# Patient Record
Sex: MALE | Race: OTHER | Employment: PART TIME | URBAN - METROPOLITAN AREA
[De-identification: names, ages, dates, MRNs, and addresses within clinical notes are randomized per-mention and may not be internally consistent; named-entity substitution may affect disease eponyms.]

---

## 2019-01-18 ENCOUNTER — HOSPITAL ENCOUNTER (OUTPATIENT)
Dept: GENERAL RADIOLOGY | Age: 39
Discharge: HOME | End: 2019-01-20

## 2019-01-18 DIAGNOSIS — M54.41 CHRONIC RIGHT-SIDED LOW BACK PAIN WITH RIGHT-SIDED SCIATICA: ICD-10-CM

## 2019-01-18 DIAGNOSIS — G89.29 CHRONIC RIGHT-SIDED LOW BACK PAIN WITH RIGHT-SIDED SCIATICA: ICD-10-CM

## 2019-01-18 PROCEDURE — 72100 X-RAY EXAM L-S SPINE 2/3 VWS: CPT

## 2019-06-17 ENCOUNTER — HOSPITAL ENCOUNTER (EMERGENCY)
Age: 39
Discharge: HOME | End: 2019-06-17

## 2019-06-17 ENCOUNTER — APPOINTMENT (OUTPATIENT)
Dept: CT IMAGING | Age: 39
End: 2019-06-17

## 2019-06-17 VITALS
WEIGHT: 170 LBS | SYSTOLIC BLOOD PRESSURE: 113 MMHG | TEMPERATURE: 98.3 F | HEART RATE: 79 BPM | HEIGHT: 68 IN | OXYGEN SATURATION: 100 % | RESPIRATION RATE: 16 BRPM | BODY MASS INDEX: 25.76 KG/M2 | DIASTOLIC BLOOD PRESSURE: 69 MMHG

## 2019-06-17 DIAGNOSIS — N20.1 URETEROLITHIASIS: Primary | ICD-10-CM

## 2019-06-17 LAB
ALBUMIN SERPL-MCNC: 4.1 G/DL (ref 3.5–4.6)
ALP BLD-CCNC: 93 U/L (ref 35–104)
ALT SERPL-CCNC: 27 U/L (ref 0–41)
ANION GAP SERPL CALCULATED.3IONS-SCNC: 10 MEQ/L (ref 9–15)
AST SERPL-CCNC: 22 U/L (ref 0–40)
BASOPHILS ABSOLUTE: 0.1 K/UL (ref 0–0.2)
BASOPHILS RELATIVE PERCENT: 0.8 %
BILIRUB SERPL-MCNC: 0.4 MG/DL (ref 0.2–0.7)
BUN BLDV-MCNC: 21 MG/DL (ref 6–20)
CALCIUM SERPL-MCNC: 9.1 MG/DL (ref 8.5–9.9)
CHLORIDE BLD-SCNC: 104 MEQ/L (ref 95–107)
CO2: 27 MEQ/L (ref 20–31)
CREAT SERPL-MCNC: 1.03 MG/DL (ref 0.7–1.2)
EOSINOPHILS ABSOLUTE: 0.6 K/UL (ref 0–0.7)
EOSINOPHILS RELATIVE PERCENT: 5.9 %
GFR AFRICAN AMERICAN: >60
GFR NON-AFRICAN AMERICAN: >60
GLOBULIN: 2.6 G/DL (ref 2.3–3.5)
GLUCOSE BLD-MCNC: 139 MG/DL (ref 70–99)
HCT VFR BLD CALC: 43.3 % (ref 42–52)
HEMOGLOBIN: 15 G/DL (ref 14–18)
LIPASE: 36 U/L (ref 12–95)
LYMPHOCYTES ABSOLUTE: 4.2 K/UL (ref 1–4.8)
LYMPHOCYTES RELATIVE PERCENT: 39 %
MCH RBC QN AUTO: 31.4 PG (ref 27–31.3)
MCHC RBC AUTO-ENTMCNC: 34.6 % (ref 33–37)
MCV RBC AUTO: 90.7 FL (ref 80–100)
MONOCYTES ABSOLUTE: 0.9 K/UL (ref 0.2–0.8)
MONOCYTES RELATIVE PERCENT: 8.7 %
NEUTROPHILS ABSOLUTE: 4.9 K/UL (ref 1.4–6.5)
NEUTROPHILS RELATIVE PERCENT: 45.6 %
PDW BLD-RTO: 13.5 % (ref 11.5–14.5)
PLATELET # BLD: 488 K/UL (ref 130–400)
POTASSIUM SERPL-SCNC: 3.8 MEQ/L (ref 3.4–4.9)
RBC # BLD: 4.78 M/UL (ref 4.7–6.1)
SODIUM BLD-SCNC: 141 MEQ/L (ref 135–144)
TOTAL PROTEIN: 6.7 G/DL (ref 6.3–8)
WBC # BLD: 10.7 K/UL (ref 4.8–10.8)

## 2019-06-17 PROCEDURE — 83690 ASSAY OF LIPASE: CPT

## 2019-06-17 PROCEDURE — 6370000000 HC RX 637 (ALT 250 FOR IP): Performed by: NURSE PRACTITIONER

## 2019-06-17 PROCEDURE — 80053 COMPREHEN METABOLIC PANEL: CPT

## 2019-06-17 PROCEDURE — 85025 COMPLETE CBC W/AUTO DIFF WBC: CPT

## 2019-06-17 PROCEDURE — 36415 COLL VENOUS BLD VENIPUNCTURE: CPT

## 2019-06-17 PROCEDURE — 96376 TX/PRO/DX INJ SAME DRUG ADON: CPT

## 2019-06-17 PROCEDURE — 74176 CT ABD & PELVIS W/O CONTRAST: CPT

## 2019-06-17 PROCEDURE — 6360000002 HC RX W HCPCS: Performed by: NURSE PRACTITIONER

## 2019-06-17 PROCEDURE — 99284 EMERGENCY DEPT VISIT MOD MDM: CPT

## 2019-06-17 PROCEDURE — 96375 TX/PRO/DX INJ NEW DRUG ADDON: CPT

## 2019-06-17 PROCEDURE — 96374 THER/PROPH/DIAG INJ IV PUSH: CPT

## 2019-06-17 RX ORDER — OXYCODONE HYDROCHLORIDE AND ACETAMINOPHEN 5; 325 MG/1; MG/1
1 TABLET ORAL EVERY 6 HOURS PRN
Qty: 10 TABLET | Refills: 0 | Status: SHIPPED | OUTPATIENT
Start: 2019-06-17 | End: 2019-06-22

## 2019-06-17 RX ORDER — ONDANSETRON 2 MG/ML
4 INJECTION INTRAMUSCULAR; INTRAVENOUS ONCE
Status: COMPLETED | OUTPATIENT
Start: 2019-06-17 | End: 2019-06-17

## 2019-06-17 RX ORDER — TAMSULOSIN HYDROCHLORIDE 0.4 MG/1
0.4 CAPSULE ORAL DAILY
Qty: 10 CAPSULE | Refills: 0 | Status: SHIPPED | OUTPATIENT
Start: 2019-06-17

## 2019-06-17 RX ORDER — OXYCODONE HYDROCHLORIDE AND ACETAMINOPHEN 5; 325 MG/1; MG/1
1 TABLET ORAL EVERY 6 HOURS PRN
Qty: 10 TABLET | Refills: 0 | Status: SHIPPED | OUTPATIENT
Start: 2019-06-17 | End: 2019-06-17 | Stop reason: SDUPTHER

## 2019-06-17 RX ORDER — TAMSULOSIN HYDROCHLORIDE 0.4 MG/1
0.8 CAPSULE ORAL DAILY
Status: DISCONTINUED | OUTPATIENT
Start: 2019-06-17 | End: 2019-06-17 | Stop reason: HOSPADM

## 2019-06-17 RX ORDER — MORPHINE SULFATE 2 MG/ML
4 INJECTION, SOLUTION INTRAMUSCULAR; INTRAVENOUS
Status: COMPLETED | OUTPATIENT
Start: 2019-06-17 | End: 2019-06-17

## 2019-06-17 RX ORDER — KETOROLAC TROMETHAMINE 30 MG/ML
30 INJECTION, SOLUTION INTRAMUSCULAR; INTRAVENOUS ONCE
Status: COMPLETED | OUTPATIENT
Start: 2019-06-17 | End: 2019-06-17

## 2019-06-17 RX ADMIN — ONDANSETRON 4 MG: 2 INJECTION INTRAMUSCULAR; INTRAVENOUS at 05:30

## 2019-06-17 RX ADMIN — MORPHINE SULFATE 4 MG: 2 INJECTION, SOLUTION INTRAMUSCULAR; INTRAVENOUS at 05:30

## 2019-06-17 RX ADMIN — MORPHINE SULFATE 4 MG: 2 INJECTION, SOLUTION INTRAMUSCULAR; INTRAVENOUS at 07:03

## 2019-06-17 RX ADMIN — KETOROLAC TROMETHAMINE 30 MG: 30 INJECTION, SOLUTION INTRAMUSCULAR; INTRAVENOUS at 05:30

## 2019-06-17 RX ADMIN — TAMSULOSIN HYDROCHLORIDE 0.8 MG: 0.4 CAPSULE ORAL at 07:03

## 2019-06-17 SDOH — HEALTH STABILITY: MENTAL HEALTH: HOW OFTEN DO YOU HAVE A DRINK CONTAINING ALCOHOL?: NEVER

## 2019-06-17 ASSESSMENT — ENCOUNTER SYMPTOMS
CHEST TIGHTNESS: 0
ABDOMINAL PAIN: 0
DIARRHEA: 0
COUGH: 0
VOMITING: 0
BACK PAIN: 0
CONSTIPATION: 0
SHORTNESS OF BREATH: 0
COLOR CHANGE: 0
RHINORRHEA: 0
ABDOMINAL DISTENTION: 0
NAUSEA: 0
WHEEZING: 0

## 2019-06-17 ASSESSMENT — PAIN DESCRIPTION - PAIN TYPE
TYPE: ACUTE PAIN
TYPE: ACUTE PAIN

## 2019-06-17 ASSESSMENT — PAIN DESCRIPTION - LOCATION
LOCATION: FLANK
LOCATION: FLANK

## 2019-06-17 ASSESSMENT — PAIN DESCRIPTION - DESCRIPTORS
DESCRIPTORS: ACHING
DESCRIPTORS: ACHING;SHARP

## 2019-06-17 ASSESSMENT — PAIN SCALES - GENERAL
PAINLEVEL_OUTOF10: 6
PAINLEVEL_OUTOF10: 8
PAINLEVEL_OUTOF10: 8
PAINLEVEL_OUTOF10: 5

## 2019-06-17 ASSESSMENT — PAIN DESCRIPTION - ORIENTATION
ORIENTATION: LEFT
ORIENTATION: LEFT

## 2019-06-17 ASSESSMENT — PAIN DESCRIPTION - FREQUENCY: FREQUENCY: CONTINUOUS

## 2019-06-17 NOTE — ED PROVIDER NOTES
[address] [facility] ED  eMERGENCY dEPARTMENT eNCOUnter      Pt Name: Agatha Singh  MRN: [id number]  [place] 1980  Date of evaluation: 6/17/2019  Provider: Matias Beckwith       Chief Complaint   Patient presents with    Flank Pain         HISTORY OF PRESENT ILLNESS   (Location/Symptom, Timing/Onset,Context/Setting, Quality, Duration, Modifying Factors, Severity)  Note limiting factors. Agatha Singh is a 46 y.o. male who presents to the emergency department for complaint of sudden onset of left-sided flank pain starting around 2 AM this morning. Patient states that the pain was intense 8 out of 10 sharp intense aching throbbing pain with no modifying factors. Patient denies any injuries to the left flank or lower back region. He denies any obvious dysuria difficulty urinating blood in his urine or previous history of kidney stones or kidney infections. Patient states that he had no symptoms yesterday felt very good was eating and drinking well. Patient denies any abdominal pain in other areas or previous abdominal pain nausea vomiting diarrhea yesterday. He states that as the pain progressed he began to feel very nauseated and vomited twice on arrival to the ER. He denies any known fevers chills or sweats. Nursing Notes were reviewed. REVIEW OF SYSTEMS    (2-9 systems for level 4, 10 or more for level 5)     Review of Systems   Constitutional: Negative for activity change, appetite change, chills, diaphoresis, fatigue and fever. HENT: Negative for congestion and rhinorrhea. Eyes: Negative for visual disturbance. Respiratory: Negative for cough, chest tightness, shortness of breath and wheezing. Cardiovascular: Negative for chest pain and palpitations. Gastrointestinal: Negative for abdominal distention, abdominal pain, constipation, diarrhea, nausea and vomiting. Genitourinary: Positive for flank pain.  Negative for difficulty Height Weight   105/74 98.3 °F (36.8 °C) Temporal 56 20 100 % 5' 8\" (1.727 m) 170 lb (77.1 kg)       Physical Exam   Constitutional: He is oriented to person, place, and time. He appears well-developed and well-nourished. No distress. HENT:   Head: Normocephalic and atraumatic. Eyes: Conjunctivae are normal. Right eye exhibits no discharge. Left eye exhibits no discharge. Neck: Normal range of motion. Cardiovascular: Normal rate, regular rhythm and intact distal pulses. Pulmonary/Chest: Effort normal and breath sounds normal.   Abdominal: Soft. He exhibits no distension. There is no tenderness. There is no rigidity, no rebound, no guarding and no CVA tenderness. Musculoskeletal: Normal range of motion. He exhibits no tenderness. Neurological: He is alert and oriented to person, place, and time. Skin: Skin is warm and dry. Capillary refill takes less than 2 seconds. He is not diaphoretic.        DIAGNOSTIC RESULTS     EKG: All EKG's are interpreted by the Emergency Department Physician who either signs or Co-signsthis chart in the absence of a cardiologist.        RADIOLOGY:   Rosiland Settle such as CT, Ultrasound and MRI are read by the radiologist. Plain radiographic images are visualized and preliminarily interpreted by the emergency physician with the below findings:        Interpretation per the Radiologist below, if available at the time ofthis note:    CT ABDOMEN PELVIS WO CONTRAST Additional Contrast? None    (Results Pending)         ED BEDSIDE ULTRASOUND:   Performed by ED Physician - none    LABS:  Labs Reviewed   CBC WITH AUTO DIFFERENTIAL - Abnormal; Notable for the following components:       Result Value    MCH 31.4 (*)     Platelets 041 (*)     Monocytes # 0.9 (*)     All other components within normal limits   COMPREHENSIVE METABOLIC PANEL - Abnormal; Notable for the following components:    Glucose 139 (*)     BUN 21 (*)     All other components within normal limits   LIPASE URINE RT REFLEX TO CULTURE       All other labs were within normal range or not returned as of this dictation. EMERGENCY DEPARTMENT COURSE and DIFFERENTIAL DIAGNOSIS/MDM:   Vitals:    Vitals:    06/17/19 0449 06/17/19 0601   BP: 105/74 110/68   Pulse: 56 68   Resp: 20 16   Temp: 98.3 °F (36.8 °C)    TempSrc: Temporal    SpO2: 100% 98%   Weight: 170 lb (77.1 kg)    Height: 5' 8\" (1.727 m)             MDM patient reexamined. He appears comfortable. His vital signs are normal.  He does have some remaining left-sided CVA tenderness. Abdominal exam is benign. Laboratory and radiology studies are reviewed, patient and wife were educated regarding results. Discharge instructions were provided including signs symptoms and red flags of which to return to the emergency department. They expressed good understanding of these instructions. Standard anticipatory guidance given to patient upon discharge. Have given them a specific time frame in which to follow-up and who to follow-up with. I have also advised them that they should return to the emergency department if they get worse, or not getting better or develop any new or concerning symptoms. Patient demonstrates understanding and all questions were answered. CRITICAL CARE TIME       CONSULTS:  None    PROCEDURES:  Unless otherwise noted below, none     Procedures    FINAL IMPRESSION      1. Ureterolithiasis          DISPOSITION/PLAN   DISPOSITION        PATIENT REFERRED TO:  Glendy [address]ox [address]  [facility] [address]  [phone]    Call in 1 day  For continued evaluation and management    Will Hwang MD  [facility] [address] [facility] [address] [phone]    Call in 1 day  For continued evaluation and management      DISCHARGE MEDICATIONS:  New Prescriptions    DICLOFENAC (VOLTAREN) 50 MG EC TABLET    Take 1 tablet by mouth 2 times daily    OXYCODONE-ACETAMINOPHEN (PERCOCET) 5-325 MG PER TABLET    Take 1 tablet by mouth every 6 hours as needed for Pain for up to 5 days.     TAMSULOSIN (FLOMAX) 0.4 MG CAPSULE    Take 1 capsule by mouth daily          (Please notethat portions of this note were completed with a voice recognition program.  Efforts were made to edit the dictations but occasionally words are mis-transcribed.)    LALA Linares CNP (electronically signed)  Attending Emergency Physician         LALA Linares CNP  06/17/19 0700

## 2019-06-17 NOTE — ED NOTES
Patient now rates pain 4/10 in left flank, discharged with steady gait to the [facility].       Hailey Cullen RN  06/17/19 6614

## 2019-06-17 NOTE — ED TRIAGE NOTES
Pt states that he had a sudden onset of left sided flank pain tonight. Pts pain is so severe that he has had periods of vomiting.

## 2021-09-14 PROCEDURE — 99282 EMERGENCY DEPT VISIT SF MDM: CPT

## 2021-09-15 ENCOUNTER — HOSPITAL ENCOUNTER (EMERGENCY)
Age: 41
Discharge: HOME | End: 2021-09-15

## 2021-09-15 VITALS
SYSTOLIC BLOOD PRESSURE: 113 MMHG | WEIGHT: 165 LBS | TEMPERATURE: 97.7 F | RESPIRATION RATE: 20 BRPM | BODY MASS INDEX: 25.09 KG/M2 | OXYGEN SATURATION: 99 % | HEART RATE: 77 BPM | DIASTOLIC BLOOD PRESSURE: 71 MMHG

## 2021-09-15 VITALS
TEMPERATURE: 98 F | SYSTOLIC BLOOD PRESSURE: 107 MMHG | BODY MASS INDEX: 27.28 KG/M2 | HEIGHT: 68 IN | OXYGEN SATURATION: 98 % | HEART RATE: 67 BPM | DIASTOLIC BLOOD PRESSURE: 64 MMHG | RESPIRATION RATE: 16 BRPM | WEIGHT: 180 LBS

## 2021-09-15 DIAGNOSIS — L60.0 INGROWN TOENAIL: Primary | ICD-10-CM

## 2021-09-15 PROCEDURE — 12001 RPR S/N/AX/GEN/TRNK 2.5CM/<: CPT

## 2021-09-15 PROCEDURE — 99283 EMERGENCY DEPT VISIT LOW MDM: CPT

## 2021-09-15 RX ORDER — SULFAMETHOXAZOLE AND TRIMETHOPRIM 800; 160 MG/1; MG/1
1 TABLET ORAL 2 TIMES DAILY
Qty: 14 TABLET | Refills: 0 | Status: SHIPPED | OUTPATIENT
Start: 2021-09-15 | End: 2021-09-22

## 2021-09-15 RX ORDER — HYDROCODONE BITARTRATE AND ACETAMINOPHEN 5; 325 MG/1; MG/1
1 TABLET ORAL EVERY 6 HOURS PRN
Qty: 10 TABLET | Refills: 0 | Status: SHIPPED | OUTPATIENT
Start: 2021-09-15 | End: 2021-09-18

## 2021-09-15 ASSESSMENT — ENCOUNTER SYMPTOMS
COLOR CHANGE: 0
BLOOD IN STOOL: 0
ABDOMINAL PAIN: 0
RHINORRHEA: 0
COUGH: 1
CONSTIPATION: 0
DIARRHEA: 0
SORE THROAT: 0
SHORTNESS OF BREATH: 0
VOMITING: 0
TROUBLE SWALLOWING: 0
EYE DISCHARGE: 0
BACK PAIN: 0
WHEEZING: 0
EYE REDNESS: 0
EYE PAIN: 0
NAUSEA: 0

## 2021-09-15 ASSESSMENT — PAIN SCALES - GENERAL: PAINLEVEL_OUTOF10: 7

## 2021-09-15 ASSESSMENT — PAIN DESCRIPTION - LOCATION: LOCATION: TOE (COMMENT WHICH ONE)

## 2021-09-15 ASSESSMENT — PAIN DESCRIPTION - PAIN TYPE: TYPE: ACUTE PAIN

## 2021-09-15 ASSESSMENT — PAIN DESCRIPTION - ORIENTATION: ORIENTATION: RIGHT

## 2021-09-15 NOTE — ED PROVIDER NOTES
[address] [facility] ED  EMERGENCY DEPARTMENT ENCOUNTER      Pt Name: Galina Do  MRN: [id number]  [place] 1980  Date of evaluation: 9/14/2021  Provider: LALA Araya 6626       Chief Complaint   Patient presents with    Ingrown Toenail     right great toe         HISTORY OF PRESENT ILLNESS   (Location/Symptom, Timing/Onset,Context/Setting, Quality, Duration, Modifying Factors, Severity)  Note limiting factors. Galina Do is a 37 y.o. male who presents to the emergency department with no past medical history for chief complaint of a right great toe ingrowing nail that the patient was trying to take out and failed. Patient complaining of 7 out of 10 throbbing toe pain with some blood noted when he tried to take the ingrown nail out and failed. This occurred approximately 10 PM tonight. Patient reports the bleeding has been under control. No alleviating or modifying factors. Nursing Notes were reviewed. REVIEW OF SYSTEMS    (2-9 systems for level 4, 10 or more for level 5)     Review of Systems   Constitutional: Negative for activity change, appetite change, fatigue and fever. HENT: Positive for congestion. Negative for ear pain, rhinorrhea, sore throat and trouble swallowing. Eyes: Negative for pain, discharge and redness. Respiratory: Positive for cough. Negative for shortness of breath and wheezing. Cardiovascular: Negative for chest pain and palpitations. Gastrointestinal: Negative for abdominal pain, blood in stool, constipation, diarrhea, nausea and vomiting. Endocrine: Negative for polydipsia and polyuria. Genitourinary: Negative for decreased urine volume, dysuria, flank pain and hematuria. Musculoskeletal: Negative for arthralgias, back pain and myalgias. Skin: Negative for color change, rash and wound. Neurological: Negative for dizziness, syncope, weakness, light-headedness and headaches.    Psychiatric/Behavioral: Negative for behavioral problems. All other systems reviewed and are negative. Except as noted above the remainder of the review of systems was reviewed and negative. PAST MEDICAL HISTORY   No past medical history on file. No past surgical history on file. Social History     Socioeconomic History    Marital status: Single     Spouse name: Not on file    Number of children: Not on file    Years of education: Not on file    Highest education level: Not on file   Occupational History    Not on file   Tobacco Use    Smoking status: Never Smoker    Smokeless tobacco: Never Used   Substance and Sexual Activity    Alcohol use: Never    Drug use: Never    Sexual activity: Not on file   Other Topics Concern    Not on file   Social History Narrative    Not on file     Social Determinants of Health     Financial Resource Strain:     Difficulty of Paying Living Expenses:    Food Insecurity:     Worried About Running Out of Food in the Last Year:     [address] in the Last Year:    Transportation Needs:     Lack of Transportation (Medical):      Lack of Transportation (Non-Medical):    Physical Activity:     Days of Exercise per Week:     Minutes of Exercise per Session:    Stress:     Feeling of Stress :    Social Connections:     Frequency of Communication with Friends and Family:     Frequency of Social Gatherings with Friends and Family:     Attends Holiness Services:     Active Member of Clubs or Organizations:     Attends Club or Organization Meetings:     Marital Status:    Intimate Partner Violence:     Fear of Current or Ex-Partner:     Emotionally Abused:     Physically Abused:     Sexually Abused:        SCREENINGS             PHYSICAL EXAM    (up to 7 for level 4, 8 or more for level 5)     ED Triage Vitals [09/15/21 0136]   BP Temp Temp Source Pulse Resp SpO2 Height Weight   113/71 97.7 °F (36.5 °C) Oral 77 20 99 % -- 165 lb (74.8 kg)       Physical Exam  Vitals and nursing note reviewed. Constitutional:       General: He is not in acute distress. Appearance: He is well-developed. He is not diaphoretic. HENT:      Head: Normocephalic and atraumatic. Nose: Nose normal.   Eyes:      Conjunctiva/sclera: Conjunctivae normal.      Pupils: Pupils are equal, round, and reactive to light. Cardiovascular:      Rate and Rhythm: Normal rate and regular rhythm. Heart sounds: Normal heart sounds. Pulmonary:      Effort: Pulmonary effort is normal. No respiratory distress. Breath sounds: Normal breath sounds. No wheezing. Abdominal:      General: Bowel sounds are normal.      Palpations: Abdomen is soft. Tenderness: There is no abdominal tenderness. Musculoskeletal:      Cervical back: Normal range of motion and neck supple. Skin:     General: Skin is warm and dry. Capillary Refill: Capillary refill takes less than 2 seconds. Findings: No rash. Neurological:      Mental Status: He is alert and oriented to person, place, and time. Cranial Nerves: No cranial nerve deficit. Psychiatric:         Behavior: Behavior normal.         RESULTS     EKG: All EKG's are interpreted by the Emergency Department Physician who either signs or Co-signsthis chart in the absence of a cardiologist.        RADIOLOGY:   Sharmon Bozena such as CT, Ultrasound and MRI are read by the radiologist. Plain radiographic images are visualized and preliminarily interpreted by the emergency physician with the below findings:        Interpretation per the Radiologist below, if available at the time ofthis note:    No orders to display         ED BEDSIDE ULTRASOUND:   Performed by ED Physician - none    LABS:  Labs Reviewed - No data to display    All other labs were within normal range or not returned as of this dictation.     EMERGENCY DEPARTMENT COURSE and DIFFERENTIAL DIAGNOSIS/MDM:   Vitals:    Vitals:    09/15/21 0136   BP: 113/71   Pulse: 77   Resp: 20   Temp: 97.7 °F (36.5 °C)   TempSrc: Oral   SpO2: 99%   Weight: 165 lb (74.8 kg)            MDM   Patient is a 43-year-old male presenting to the ER with a chief complaint of right great toe ingrowing nail that he was trying to take out and failed and now is hurting him. He is hemodynamically stable nontoxic-appearing. Patient given a prescription for Bactrim antibiotic since there is exposed nail. Given Norco for the pain. Instructed to see podiatry and referral given. Instructed to come back to the ER if worse in any way. Discharged in stable condition with stable vital signs. CRITICAL CARE TIME       CONSULTS:  None    PROCEDURES:  Unless otherwise noted below, none     Procedures    FINAL IMPRESSION      1. Ingrown toenail          DISPOSITION/PLAN   DISPOSITION Decision To Discharge 09/15/2021 02:24:08 AM      PATIENT REFERRED TO:  Zohaib Maddox [address]  [place] [address]  [phone]    Schedule an appointment as soon as possible for a visit in 1 day        DISCHARGE MEDICATIONS:  New Prescriptions    HYDROCODONE-ACETAMINOPHEN (NORCO) 5-325 MG PER TABLET    Take 1 tablet by mouth every 6 hours as needed for Pain for up to 3 days. Intended supply: 3 days.  Take lowest dose possible to manage pain    SULFAMETHOXAZOLE-TRIMETHOPRIM (BACTRIM DS;SEPTRA DS) 800-160 MG PER TABLET    Take 1 tablet by mouth 2 times daily for 7 days          (Please notethat portions of this note were completed with a voice recognition program.  Efforts were made to edit the dictations but occasionally words are mis-transcribed.)    LALA Lindquist CNP (electronically signed)  Attending Emergency Physician          LALA Garcia CNP  09/15/21 1124

## 2021-09-16 ENCOUNTER — HOSPITAL ENCOUNTER (EMERGENCY)
Age: 41
Discharge: HOME | End: 2021-09-16

## 2021-09-16 DIAGNOSIS — S91.211A LACERATION OF RIGHT GREAT TOE WITHOUT FOREIGN BODY WITH DAMAGE TO NAIL, INITIAL ENCOUNTER: Primary | ICD-10-CM

## 2021-09-16 PROCEDURE — 6370000000 HC RX 637 (ALT 250 FOR IP): Performed by: PHYSICIAN ASSISTANT

## 2021-09-16 RX ORDER — BACITRACIN, NEOMYCIN, POLYMYXIN B 400; 3.5; 5 [USP'U]/G; MG/G; [USP'U]/G
OINTMENT TOPICAL ONCE
Status: COMPLETED | OUTPATIENT
Start: 2021-09-16 | End: 2021-09-16

## 2021-09-16 RX ORDER — GINSENG 100 MG
CAPSULE ORAL
Qty: 1 EACH | Refills: 0 | Status: SHIPPED | OUTPATIENT
Start: 2021-09-16

## 2021-09-16 RX ADMIN — BACITRACIN, NEOMYCIN, POLYMYXIN B: 400; 3.5; 5 OINTMENT TOPICAL at 01:08

## 2021-09-16 ASSESSMENT — ENCOUNTER SYMPTOMS
TROUBLE SWALLOWING: 0
SHORTNESS OF BREATH: 0
ALLERGIC/IMMUNOLOGIC NEGATIVE: 1
APNEA: 0
COLOR CHANGE: 0
ABDOMINAL PAIN: 0
EYE PAIN: 0

## 2021-09-16 NOTE — ED NOTES
Pt understands discharge instructions.   Pt instructed to follow up with PCP   Prescriptions explained   Pt told to come back for new or worsening symptoms  No further questions         Shaw Gonzalez RN  09/16/21 0105

## 2021-09-16 NOTE — ED TRIAGE NOTES
Arrived to the ER for laceration to the right great toe. Was seen in the ER yesterday for suspected ingrown toenail and referred to podiatrist. Today pt attempted to remove ingrown toenail on his own by cutting the toe. Prior to cutting self the pt had approx 5 ETOH drinks. Laceration to the lateral side of the right great toe. No active bleeding noted at this time. Dressing applied to toe.

## 2021-09-16 NOTE — ED PROVIDER NOTES
[address] [facility] ED  eMERGENCYdEPARTMENT eNCOUnter      Pt Name: Rohan Cornelius  MRN: [id number]  [place] 1980  Date of evaluation: 9/15/2021  Provider:Zeke Kitchen PA-C    CHIEF COMPLAINT       Chief Complaint   Patient presents with    Laceration         HISTORY OF PRESENT ILLNESS  (Location/Symptom, Timing/Onset, Context/Setting, Quality, Duration, Modifying Factors, Severity.)   Rohan Cornelius is a 37 y.o. male who presents to the emergency department with a self-inflicted laceration to the medial aspect of the right hallux. Patient was seen in the emergency department yesterday for ingrown toenail. Patient was referred to a podiatrist however patient was unable to be seen until Monday so the patient attempted to cut the ingrown toenail out himself, causing a 1.5 cm laceration along the toe. Patient does admit to heavy alcohol consumption prior to attempting this today. Patient states he is unable to control the bleeding. Patient was prescribed medicines but family states that they have not gotten around to picking up the prescriptions yet    HPI    Nursing Notes were reviewed and I agree. REVIEW OF SYSTEMS    (2-9 systems for level 4, 10 or more for level 5)     Review of Systems   Constitutional: Negative for diaphoresis and fever. HENT: Negative for hearing loss and trouble swallowing. Eyes: Negative for pain. Respiratory: Negative for apnea and shortness of breath. Cardiovascular: Negative for chest pain. Gastrointestinal: Negative for abdominal pain. Endocrine: Negative. Genitourinary: Negative for hematuria. Musculoskeletal: Negative for neck pain and neck stiffness. Skin: Positive for wound. Negative for color change. Allergic/Immunologic: Negative. Neurological: Negative for dizziness and numbness. Hematological: Negative. Psychiatric/Behavioral: Negative. All other systems reviewed and are negative.        Except as noted above the remainder of the review of systems was reviewed and negative. PAST MEDICAL HISTORY   History reviewed. No pertinent past medical history. SURGICAL HISTORY     History reviewed. No pertinent surgical history. CURRENT MEDICATIONS       Previous Medications    DICLOFENAC (VOLTAREN) 50 MG EC TABLET    Take 1 tablet by mouth 2 times daily    HYDROCODONE-ACETAMINOPHEN (NORCO) 5-325 MG PER TABLET    Take 1 tablet by mouth every 6 hours as needed for Pain for up to 3 days. Intended supply: 3 days. Take lowest dose possible to manage pain    SULFAMETHOXAZOLE-TRIMETHOPRIM (BACTRIM DS;SEPTRA DS) 800-160 MG PER TABLET    Take 1 tablet by mouth 2 times daily for 7 days    TAMSULOSIN (FLOMAX) 0.4 MG CAPSULE    Take 1 capsule by mouth daily       ALLERGIES     Patient has no known allergies. FAMILY HISTORY     History reviewed. No pertinent family history. SOCIAL HISTORY       Social History     Socioeconomic History    Marital status: Single     Spouse name: None    Number of children: None    Years of education: None    Highest education level: None   Occupational History    None   Tobacco Use    Smoking status: Never Smoker    Smokeless tobacco: Never Used   Substance and Sexual Activity    Alcohol use: Never    Drug use: Never    Sexual activity: None   Other Topics Concern    None   Social History Narrative    None     Social Determinants of Health     Financial Resource Strain:     Difficulty of Paying Living Expenses:    Food Insecurity:     Worried About Running Out of Food in the Last Year:     Ran Out of Food in the Last Year:    Transportation Needs:     Lack of Transportation (Medical):      Lack of Transportation (Non-Medical):    Physical Activity:     Days of Exercise per Week:     Minutes of Exercise per Session:    Stress:     Feeling of Stress :    Social Connections:     Frequency of Communication with Friends and Family:     Frequency of Social Gatherings with as CT, Ultrasound and MRI are read by the radiologist. Plain radiographic images are visualized and preliminarilyinterpreted by Yessica Heller PA-C with the below findings:      Interpretation per the Radiologist below, if available at the time of this note:    No orders to display       LABS:  Labs Reviewed - No data to display    All other labs were within normal range or not returnedas of this dictation. EMERGENCYDEPARTMENT COURSE and DIFFERENTIAL DIAGNOSIS/MDM:   Vitals:    Vitals:    09/15/21 2232   BP: 107/64   Pulse: 67   Resp: 16   Temp: 98 °F (36.7 °C)   TempSrc: Oral   SpO2: 98%   Weight: 180 lb (81.6 kg)   Height: 5' 8\" (1.727 m)       REASSESSMENT        Patient presented to the emergency department with a laceration of the medial aspect of the right great toe. Wound was closed with Prolene. Patient was advised to start taking the antibiotics he was prescribed yesterday and follow-up with podiatry as scheduled    MDM    PROCEDURES:    Lac Repair    Date/Time: 9/16/2021 12:39 AM  Performed by: Yessica Heller PA-C  Authorized by: Yessica Heller PA-C     Consent:     Consent obtained:  Verbal    Consent given by:  Patient    Risks discussed:  Pain, poor cosmetic result and poor wound healing  Anesthesia (see MAR for exact dosages):      Anesthesia method:  Local infiltration    Local anesthetic:  Lidocaine 1% w/o epi  Laceration details:     Location:  Toe    Toe location:  R big toe    Length (cm):  1.5    Depth (mm):  2  Repair type:     Repair type:  Simple  Pre-procedure details:     Preparation:  Patient was prepped and draped in usual sterile fashion  Exploration:     Hemostasis achieved with:  Direct pressure    Wound exploration: wound explored through full range of motion and entire depth of wound probed and visualized      Wound extent: no foreign bodies/material noted and no underlying fracture noted    Treatment:     Area cleansed with:  Hibiclens and saline    Amount of cleaning:  Standard Irrigation solution:  Sterile saline  Skin repair:     Repair method:  Sutures    Suture size:  4-0    Suture material:  Prolene    Suture technique:  Simple interrupted    Number of sutures:  3  Approximation:     Approximation:  Close  Post-procedure details:     Dressing:  Antibiotic ointment and non-adherent dressing    Patient tolerance of procedure: Tolerated well, no immediate complications          FINAL IMPRESSION      1. Laceration of right great toe without foreign body with damage to nail, initial encounter          DISPOSITION/PLAN   DISPOSITION Decision To Discharge 09/16/2021 12:56:24 AM      PATIENT REFERRED TO:  Yary Jiang DPM  [address]  [facility] [address]  [phone]    Call in 2 days        DISCHARGE MEDICATIONS:  New Prescriptions    BACITRACIN 500 UNIT/GM OINTMENT    Apply topically 2 times daily.        (Please note that portions of this note were completed with a voice recognition program.  Efforts were made to edit the dictations but occasionally words are mis-transcribed.)    ANDI Montoya PA-C  09/16/21 7413

## 2021-10-12 ENCOUNTER — APPOINTMENT (OUTPATIENT)
Dept: GENERAL RADIOLOGY | Age: 41
End: 2021-10-12

## 2021-10-12 ENCOUNTER — HOSPITAL ENCOUNTER (EMERGENCY)
Age: 41
Discharge: HOME | End: 2021-10-12

## 2021-10-12 VITALS
WEIGHT: 170 LBS | HEIGHT: 68 IN | DIASTOLIC BLOOD PRESSURE: 75 MMHG | RESPIRATION RATE: 18 BRPM | HEART RATE: 98 BPM | SYSTOLIC BLOOD PRESSURE: 112 MMHG | BODY MASS INDEX: 25.76 KG/M2 | OXYGEN SATURATION: 95 % | TEMPERATURE: 102.8 F

## 2021-10-12 DIAGNOSIS — U07.1 LAB TEST POSITIVE FOR DETECTION OF COVID-19 VIRUS: Primary | ICD-10-CM

## 2021-10-12 LAB
SARS-COV-2, NAAT: DETECTED
STREP GRP A PCR: NEGATIVE

## 2021-10-12 PROCEDURE — 87635 SARS-COV-2 COVID-19 AMP PRB: CPT

## 2021-10-12 PROCEDURE — 99283 EMERGENCY DEPT VISIT LOW MDM: CPT

## 2021-10-12 PROCEDURE — 87651 STREP A DNA AMP PROBE: CPT

## 2021-10-12 PROCEDURE — 71045 X-RAY EXAM CHEST 1 VIEW: CPT

## 2021-10-12 PROCEDURE — 6370000000 HC RX 637 (ALT 250 FOR IP): Performed by: NURSE PRACTITIONER

## 2021-10-12 RX ORDER — ACETAMINOPHEN 500 MG
500 TABLET ORAL EVERY 6 HOURS PRN
Qty: 30 TABLET | Refills: 0 | Status: SHIPPED | OUTPATIENT
Start: 2021-10-12

## 2021-10-12 RX ORDER — ACETAMINOPHEN 500 MG
1000 TABLET ORAL ONCE
Status: COMPLETED | OUTPATIENT
Start: 2021-10-12 | End: 2021-10-12

## 2021-10-12 RX ORDER — DEXAMETHASONE 6 MG/1
6 TABLET ORAL DAILY
Qty: 10 TABLET | Refills: 0 | Status: SHIPPED | OUTPATIENT
Start: 2021-10-12 | End: 2021-10-22

## 2021-10-12 RX ADMIN — ACETAMINOPHEN 1000 MG: 500 TABLET ORAL at 16:32

## 2021-10-12 ASSESSMENT — ENCOUNTER SYMPTOMS
COUGH: 1
VOMITING: 0
SHORTNESS OF BREATH: 0
BACK PAIN: 0
SORE THROAT: 0
TROUBLE SWALLOWING: 0
DIARRHEA: 0
ABDOMINAL PAIN: 0
NAUSEA: 0

## 2021-10-12 ASSESSMENT — PAIN SCALES - GENERAL
PAINLEVEL_OUTOF10: 8
PAINLEVEL_OUTOF10: 8

## 2021-10-12 ASSESSMENT — PAIN DESCRIPTION - PAIN TYPE: TYPE: ACUTE PAIN

## 2021-10-12 ASSESSMENT — PAIN DESCRIPTION - LOCATION: LOCATION: HEAD

## 2021-10-12 NOTE — ED PROVIDER NOTES
[id number] ED  eMERGENCY dEPARTMENT eNCOUnter      Pt Name: Betito West  MRN: [id number]  [place] 1980  Date of evaluation: 10/12/2021  Provider: LALA Rios CNP      HISTORY OF PRESENT ILLNESS    Betito West is a 37 y.o. male who presents to the Emergency Department with headache, fever, cough x 3 days. Patient denies SOB,N/V/D. Eating and drinking without difficulty. No nuchal rigidity. Pain is moderate. REVIEW OF SYSTEMS       Review of Systems   Constitutional: Positive for fever. Negative for activity change and appetite change. HENT: Negative for congestion, drooling, ear pain, sore throat and trouble swallowing. Respiratory: Positive for cough. Negative for shortness of breath. Cardiovascular: Negative for chest pain. Gastrointestinal: Negative for abdominal pain, diarrhea, nausea and vomiting. Genitourinary: Negative for dysuria. Musculoskeletal: Negative for arthralgias, back pain and neck pain. Skin: Negative for rash. Neurological: Positive for headaches. Negative for dizziness, syncope and light-headedness. All other systems reviewed and are negative. PAST MEDICAL HISTORY   History reviewed. No pertinent past medical history. SURGICAL HISTORY     History reviewed. No pertinent surgical history. CURRENT MEDICATIONS       Previous Medications    BACITRACIN 500 UNIT/GM OINTMENT    Apply topically 2 times daily. DICLOFENAC (VOLTAREN) 50 MG EC TABLET    Take 1 tablet by mouth 2 times daily    TAMSULOSIN (FLOMAX) 0.4 MG CAPSULE    Take 1 capsule by mouth daily       ALLERGIES     Patient has no known allergies. FAMILY HISTORY     History reviewed. No pertinent family history.        SOCIAL HISTORY       Social History     Socioeconomic History    Marital status: Single     Spouse name: None    Number of children: None    Years of education: None    Highest education level: None   Occupational History    None   Tobacco Use    Smoking status: Never Smoker    Smokeless tobacco: Never Used   Substance and Sexual Activity    Alcohol use: Never    Drug use: Never    Sexual activity: None   Other Topics Concern    None   Social History Narrative    None     Social Determinants of Health     Financial Resource Strain:     Difficulty of Paying Living Expenses:    Food Insecurity:     Worried About Running Out of Food in the Last Year:     Ran Out of Food in the Last Year:    Transportation Needs:     Lack of Transportation (Medical):  Lack of Transportation (Non-Medical):    Physical Activity:     Days of Exercise per Week:     Minutes of Exercise per Session:    Stress:     Feeling of Stress :    Social Connections:     Frequency of Communication with Friends and Family:     Frequency of Social Gatherings with Friends and Family:     Attends Christianity Services:     Active Member of Clubs or Organizations:     Attends Club or Organization Meetings:     Marital Status:    Intimate Partner Violence:     Fear of Current or Ex-Partner:     Emotionally Abused:     Physically Abused:     Sexually Abused:        SCREENINGS      @FLOW([id number])@      PHYSICAL EXAM    (up to 7 for level 4, 8 or more for level 5)     ED Triage Vitals [10/12/21 1556]   BP Temp Temp src Pulse Resp SpO2 Height Weight   112/75 102.8 °F (39.3 °C) -- 98 18 95 % 5' 8\" (1.727 m) 170 lb (77.1 kg)       Physical Exam  Vitals and nursing note reviewed. Constitutional:       Appearance: He is well-developed. HENT:      Head: Normocephalic and atraumatic. Right Ear: Hearing, tympanic membrane, ear canal and external ear normal.      Left Ear: Hearing, tympanic membrane, ear canal and external ear normal.      Nose: Nose normal.      Mouth/Throat:      Lips: Pink. Mouth: Mucous membranes are moist.      Pharynx: Oropharynx is clear. Uvula midline.    Eyes:      Conjunctiva/sclera: Conjunctivae normal.      Pupils: Pupils are equal, round, and reactive to light. Cardiovascular:      Rate and Rhythm: Normal rate and regular rhythm. Heart sounds: Normal heart sounds. Pulmonary:      Effort: Pulmonary effort is normal. No accessory muscle usage or respiratory distress. Breath sounds: Normal breath sounds. No decreased air movement. No decreased breath sounds, wheezing or rhonchi. Abdominal:      General: Bowel sounds are normal. There is no distension. Palpations: Abdomen is soft. Tenderness: There is no abdominal tenderness. Musculoskeletal:         General: Normal range of motion. Cervical back: Normal range of motion and neck supple. Skin:     General: Skin is warm and dry. Neurological:      General: No focal deficit present. Mental Status: He is alert and oriented to person, place, and time. GCS: GCS eye subscore is 4. GCS verbal subscore is 5. GCS motor subscore is 6. Deep Tendon Reflexes: Reflexes are normal and symmetric. Psychiatric:         Judgment: Judgment normal.           All other labs were within normal range or not returned as of this dictation. EMERGENCY DEPARTMENT COURSE and DIFFERENTIALDIAGNOSIS/MDM:   Vitals:    Vitals:    10/12/21 1556   BP: 112/75   Pulse: 98   Resp: 18   Temp: 102.8 °F (39.3 °C)   SpO2: 95%   Weight: 170 lb (77.1 kg)   Height: 5' 8\" (1.727 m)            37 yr old male with Positive COVID. Prescriptions for Tylenol and Decadron were given to the patient. F/U with PCP in as needed. CXR with infiltrate, likely viral.  Patient is comfortable at discharge. PROCEDURES:  Unless otherwise noted below, none     Procedures      FINAL IMPRESSION      1.  Lab test positive for detection of COVID-19 virus          DISPOSITION/PLAN   DISPOSITION Decision To Discharge 10/12/2021 06:12:34 PM          LALA Falcon CNP (electronically signed)  Attending Emergency Physician     LALA aFlcon CNP  10/12/21 2367

## 2021-10-12 NOTE — Clinical Note
Nakita Kyle was seen and treated in our emergency department on 10/12/2021. He may return to work on 10/19/2021. If you have any questions or concerns, please don't hesitate to call.       Butch Bañuelos, APRN - CNP

## 2021-10-13 ENCOUNTER — CARE COORDINATION (OUTPATIENT)
Dept: CARE COORDINATION | Age: 41
End: 2021-10-13

## 2021-10-13 NOTE — CARE COORDINATION
Patient contacted regarding COVID-19 risk, exposure and diagnosis. Discussed COVID-19 related testing which was available at this time. Test results were positive. Patient informed of results, if available? Yes and Completed 10/12/2021      Ambulatory Care Manager contacted the patient by telephone to perform post discharge assessment. Call within 2 business days of discharge: Yes. Verified name and  with patient as identifiers. Provided introduction to self, and explanation of the CTN/ACM role, and reason for call due to risk factors for infection and/or exposure to COVID-19. Symptoms reviewed with patient who verbalized the following symptoms: fever, fatigue, cough, no new symptoms and no worsening symptoms. Due to no new or worsening symptoms encounter was not routed to provider for escalation. Discussed follow-up appointments. If no appointment was previously scheduled, appointment scheduling offered: No.  [facility] follow up appointment(s): No future appointments. Non-[facility] follow up appointment(s): Tito Sue    Non-face-to-face services provided:  Obtained and reviewed discharge summary and/or continuity of care documents     Advance Care Planning:   Does patient have an Advance Directive:  reviewed and current. Educated patient about risk for severe COVID-19 due to risk factors according to CDC guidelines. ACM reviewed discharge instructions, medical action plan and red flag symptoms with the patient who verbalized understanding. Discussed COVID vaccination status: Yes and Patient is not vaccinated. Education provided on COVID-19 vaccination as appropriate. Discussed exposure protocols and quarantine with CDC Guidelines. Patient was given an opportunity to verbalize any questions and concerns and agrees to contact ACM or health care provider for questions related to their healthcare.     Reviewed and educated patient on any new and changed medications related to discharge diagnosis     Was patient discharged with a pulse oximeter? No Discussed and confirmed pulse oximeter discharge instructions and when to notify provider or seek emergency care. [facility] provided contact information. Plan for follow-up call in 3-5 days based on severity of symptoms and risk factors. Ferol Rubinstein says that he is still running a fever intermittently but it is coming down. I have reviewed COVID symptoms along with preventative protocols. He has picked up the medications and he is taking all medications as prescribed  I have asked him to rest, drink, plenty of liquids, take all medications as prescribed, and monitor symptoms closely. I have also made him aware of the symptoms that require returning to the ER for evaluation. He verbalizes understanding of the information discussed.

## 2021-10-17 ENCOUNTER — CARE COORDINATION (OUTPATIENT)
Dept: CARE COORDINATION | Age: 41
End: 2021-10-17

## 2021-10-17 NOTE — CARE COORDINATION
Attempted to contact patient for 4 day follow up post ED COVID monitoring. Unable to reach patient by phone. Message left regarding purpose of call. Number provided and call back requested.

## 2021-10-18 ENCOUNTER — CARE COORDINATION (OUTPATIENT)
Dept: CARE COORDINATION | Age: 41
End: 2021-10-18

## 2021-10-18 NOTE — CARE COORDINATION
Second and final attempt to contact patient for 5 day follow up post ED COVID monitoring. Unable to reach patient by phone. Message left regarding purpose of call. Number provided and call back requested. Episode completed/resolved.

## 2022-08-30 ENCOUNTER — HOSPITAL ENCOUNTER (EMERGENCY)
Age: 42
Discharge: HOME | End: 2022-08-30
Attending: STUDENT IN AN ORGANIZED HEALTH CARE EDUCATION/TRAINING PROGRAM

## 2022-08-30 VITALS
TEMPERATURE: 97.8 F | HEART RATE: 68 BPM | SYSTOLIC BLOOD PRESSURE: 120 MMHG | OXYGEN SATURATION: 98 % | DIASTOLIC BLOOD PRESSURE: 82 MMHG | RESPIRATION RATE: 18 BRPM

## 2022-08-30 DIAGNOSIS — K13.79 ORAL BLEEDING: Primary | ICD-10-CM

## 2022-08-30 DIAGNOSIS — K08.409 HISTORY OF TOOTH EXTRACTION, UNSPECIFIED EDENTULISM CLASS: ICD-10-CM

## 2022-08-30 PROCEDURE — 99283 EMERGENCY DEPT VISIT LOW MDM: CPT

## 2022-08-30 PROCEDURE — 2500000003 HC RX 250 WO HCPCS: Performed by: STUDENT IN AN ORGANIZED HEALTH CARE EDUCATION/TRAINING PROGRAM

## 2022-08-30 RX ORDER — TRANEXAMIC ACID 100 MG/ML
500 INJECTION, SOLUTION INTRAVENOUS ONCE
Status: COMPLETED | OUTPATIENT
Start: 2022-08-30 | End: 2022-08-30

## 2022-08-30 RX ADMIN — TRANEXAMIC ACID 500 MG: 1 INJECTION, SOLUTION INTRAVENOUS at 04:32

## 2022-08-30 ASSESSMENT — PAIN - FUNCTIONAL ASSESSMENT: PAIN_FUNCTIONAL_ASSESSMENT: 0-10

## 2022-08-30 ASSESSMENT — ENCOUNTER SYMPTOMS
ABDOMINAL PAIN: 0
SHORTNESS OF BREATH: 0
DIARRHEA: 0
VOMITING: 0
NAUSEA: 0
COUGH: 0
SORE THROAT: 0
RHINORRHEA: 0
BACK PAIN: 0
EYE PAIN: 0

## 2022-08-30 ASSESSMENT — PAIN SCALES - GENERAL: PAINLEVEL_OUTOF10: 0

## 2022-08-30 NOTE — ED PROVIDER NOTES
No past medical history on file. SURGICAL HISTORY     No past surgical history on file. FAMILY HISTORY     No family history on file. SOCIAL HISTORY       Social History     Socioeconomic History    Marital status: Single   Tobacco Use    Smoking status: Never    Smokeless tobacco: Never   Substance and Sexual Activity    Alcohol use: Never    Drug use: Never       CURRENT MEDICATIONS       Previous Medications    ACETAMINOPHEN (APAP EXTRA STRENGTH) 500 MG TABLET    Take 1 tablet by mouth every 6 hours as needed for Pain or Fever (1-2 tabs)    BACITRACIN 500 UNIT/GM OINTMENT    Apply topically 2 times daily. DICLOFENAC (VOLTAREN) 50 MG EC TABLET    Take 1 tablet by mouth 2 times daily    TAMSULOSIN (FLOMAX) 0.4 MG CAPSULE    Take 1 capsule by mouth daily       ALLERGIES     Patient has no known allergies. PHYSICAL EXAM       ED Triage Vitals [08/30/22 0259]   BP Temp Temp src Heart Rate Resp SpO2 Height Weight   114/75 97.8 °F (36.6 °C) -- 73 20 97 % -- --       Physical Exam  Vitals and nursing note reviewed. Constitutional:       General: He is not in acute distress. Appearance: He is not ill-appearing. HENT:      Head: Normocephalic and atraumatic. Mouth/Throat:      Mouth: Mucous membranes are moist.      Pharynx: Oropharynx is clear. Eyes:      Extraocular Movements: Extraocular movements intact. Pupils: Pupils are equal, round, and reactive to light. Cardiovascular:      Rate and Rhythm: Normal rate and regular rhythm. Pulses: Normal pulses. Heart sounds: Normal heart sounds. Pulmonary:      Effort: Pulmonary effort is normal.      Breath sounds: Normal breath sounds. Abdominal:      General: There is no distension. Palpations: Abdomen is soft. Tenderness: There is no abdominal tenderness. Musculoskeletal:      Cervical back: Normal range of motion and neck supple. Right lower leg: No edema. Left lower leg: No edema.    Skin: General: Skin is warm and dry. Capillary Refill: Capillary refill takes less than 2 seconds. Neurological:      Mental Status: He is alert and oriented to person, place, and time. Mental status is at baseline. Psychiatric:         Mood and Affect: Mood normal.         Behavior: Behavior normal.       MDM:   Chart Reviewed: PMH and additional information as noted in HPI obtained from chart review    Vitals:    Vitals:    08/30/22 0259 08/30/22 0430   BP: 114/75 120/82   Pulse: 73 68   Resp: 20 18   Temp: 97.8 °F (36.6 °C)    SpO2: 97% 98%       PROCEDURES:  Unless otherwise noted below, none  Procedures    LABS:  Labs Reviewed - No data to display    No orders to display            40 y.o. male with no clinically significant PMH presenting to the ED via PV c/o bleeding from tooth extraction site of the left lower mandibular molar that has been ongoing since dental extraction at 2 PM yesterday. Upon initial evaluation, Pt Afebrile, HDS and in NAD. PE as noted above. Given findings, clinical presentation most likely consistent w/ bleeding extraction site which has now appeared to clot off in the ED. Low suspicion for hemodynamically significant blood loss given vitals in the ED. Not on anticoagulation. Did administer TXA soaked gauze pad to the wound to ensure bleeding cessation. We will follow-up with dentistry at the first available appointment. Stable for discharge. Pt was administered   Medications   tranexamic acid (CYKLOKAPRON) injection 500 mg (500 mg Topical Given 8/30/22 0432)       Plan: Discharge home in good condition with meds as noted below and instructions to follow up with [id number] [facility] [address]. Pt stable and appropriate for further evaluation and management as an outpatient. and Patient understanding and amenable to the POC. CRITICAL CARE TIME   Total CriticalCare time was 0 minutes, excluding separately reportable procedures.   There was a high probability of clinically significant/life threatening deterioration in the patient's condition which required my urgent intervention. FINAL IMPRESSION      1. Oral bleeding    2.  History of tooth extraction, unspecified edentulism class          DISPOSITION/PLAN   DISPOSITION Decision To Discharge 08/30/2022 04:21:13 AM      Current Discharge Medication List           MD Rory Wakefield MD  08/30/22 6146